# Patient Record
Sex: MALE | Race: WHITE | NOT HISPANIC OR LATINO | Employment: FULL TIME | URBAN - METROPOLITAN AREA
[De-identification: names, ages, dates, MRNs, and addresses within clinical notes are randomized per-mention and may not be internally consistent; named-entity substitution may affect disease eponyms.]

---

## 2017-08-29 ENCOUNTER — HOSPITAL ENCOUNTER (EMERGENCY)
Facility: HOSPITAL | Age: 32
Discharge: HOME | End: 2017-08-29
Attending: EMERGENCY MEDICINE | Admitting: EMERGENCY MEDICINE
Payer: MEDICAID

## 2017-08-29 VITALS
WEIGHT: 200 LBS | DIASTOLIC BLOOD PRESSURE: 67 MMHG | HEART RATE: 86 BPM | HEIGHT: 69 IN | RESPIRATION RATE: 16 BRPM | SYSTOLIC BLOOD PRESSURE: 127 MMHG | BODY MASS INDEX: 29.62 KG/M2 | TEMPERATURE: 98 F | OXYGEN SATURATION: 95 %

## 2017-08-29 DIAGNOSIS — F32.A DEPRESSION, UNSPECIFIED DEPRESSION TYPE: Primary | ICD-10-CM

## 2017-08-29 DIAGNOSIS — F10.920 ALCOHOL INTOXICATION, UNCOMPLICATED: ICD-10-CM

## 2017-08-29 DIAGNOSIS — R45.851 SUICIDAL IDEATION: ICD-10-CM

## 2017-08-29 LAB
ALBUMIN SERPL BCP-MCNC: 4 G/DL
ALP SERPL-CCNC: 83 U/L
ALT SERPL W/O P-5'-P-CCNC: 21 U/L
AMPHET+METHAMPHET UR QL: NEGATIVE
ANION GAP SERPL CALC-SCNC: 8 MMOL/L
APAP SERPL-MCNC: <3 UG/ML
AST SERPL-CCNC: 28 U/L
BARBITURATES UR QL SCN>200 NG/ML: NEGATIVE
BASOPHILS # BLD AUTO: 0.04 K/UL
BASOPHILS NFR BLD: 0.6 %
BENZODIAZ UR QL SCN>200 NG/ML: NEGATIVE
BILIRUB SERPL-MCNC: 0.2 MG/DL
BILIRUB UR QL STRIP: NEGATIVE
BUN SERPL-MCNC: 7 MG/DL
BZE UR QL SCN: NEGATIVE
CALCIUM SERPL-MCNC: 9 MG/DL
CANNABINOIDS UR QL SCN: NORMAL
CHLORIDE SERPL-SCNC: 110 MMOL/L
CLARITY UR REFRACT.AUTO: CLEAR
CO2 SERPL-SCNC: 24 MMOL/L
COLOR UR AUTO: NORMAL
CREAT SERPL-MCNC: 0.8 MG/DL
CREAT UR-MCNC: 26 MG/DL
DIFFERENTIAL METHOD: ABNORMAL
EOSINOPHIL # BLD AUTO: 0.4 K/UL
EOSINOPHIL NFR BLD: 6.5 %
ERYTHROCYTE [DISTWIDTH] IN BLOOD BY AUTOMATED COUNT: 13.7 %
EST. GFR  (AFRICAN AMERICAN): >60 ML/MIN/1.73 M^2
EST. GFR  (NON AFRICAN AMERICAN): >60 ML/MIN/1.73 M^2
ETHANOL SERPL-MCNC: 249 MG/DL
GLUCOSE SERPL-MCNC: 93 MG/DL
GLUCOSE UR QL STRIP: NEGATIVE
HCT VFR BLD AUTO: 42.7 %
HGB BLD-MCNC: 14.8 G/DL
HGB UR QL STRIP: NEGATIVE
KETONES UR QL STRIP: NEGATIVE
LEUKOCYTE ESTERASE UR QL STRIP: NEGATIVE
LYMPHOCYTES # BLD AUTO: 2.8 K/UL
LYMPHOCYTES NFR BLD: 42.8 %
MCH RBC QN AUTO: 32.2 PG
MCHC RBC AUTO-ENTMCNC: 34.7 G/DL
MCV RBC AUTO: 93 FL
METHADONE UR QL SCN>300 NG/ML: NEGATIVE
MONOCYTES # BLD AUTO: 0.6 K/UL
MONOCYTES NFR BLD: 8.5 %
NEUTROPHILS # BLD AUTO: 2.7 K/UL
NEUTROPHILS NFR BLD: 41.1 %
NITRITE UR QL STRIP: NEGATIVE
OPIATES UR QL SCN: NEGATIVE
PCP UR QL SCN>25 NG/ML: NEGATIVE
PH UR STRIP: 5 [PH] (ref 5–8)
PLATELET # BLD AUTO: 312 K/UL
PMV BLD AUTO: 9.5 FL
POTASSIUM SERPL-SCNC: 4.3 MMOL/L
PROT SERPL-MCNC: 7.8 G/DL
PROT UR QL STRIP: NEGATIVE
RBC # BLD AUTO: 4.59 M/UL
SALICYLATES SERPL-MCNC: <5 MG/DL
SODIUM SERPL-SCNC: 142 MMOL/L
SP GR UR STRIP: 1 (ref 1–1.03)
TOXICOLOGY INFORMATION: NORMAL
TSH SERPL DL<=0.005 MIU/L-ACNC: 0.93 UIU/ML
URN SPEC COLLECT METH UR: NORMAL
UROBILINOGEN UR STRIP-ACNC: NEGATIVE EU/DL
WBC # BLD AUTO: 6.47 K/UL

## 2017-08-29 PROCEDURE — 81003 URINALYSIS AUTO W/O SCOPE: CPT

## 2017-08-29 PROCEDURE — 80320 DRUG SCREEN QUANTALCOHOLS: CPT

## 2017-08-29 PROCEDURE — 80329 ANALGESICS NON-OPIOID 1 OR 2: CPT

## 2017-08-29 PROCEDURE — 80307 DRUG TEST PRSMV CHEM ANLYZR: CPT

## 2017-08-29 PROCEDURE — 99285 EMERGENCY DEPT VISIT HI MDM: CPT | Mod: ,,, | Performed by: EMERGENCY MEDICINE

## 2017-08-29 PROCEDURE — 99285 EMERGENCY DEPT VISIT HI MDM: CPT

## 2017-08-29 PROCEDURE — 85025 COMPLETE CBC W/AUTO DIFF WBC: CPT

## 2017-08-29 PROCEDURE — 84443 ASSAY THYROID STIM HORMONE: CPT

## 2017-08-29 PROCEDURE — 99285 EMERGENCY DEPT VISIT HI MDM: CPT | Mod: SA,HB,S$PBB, | Performed by: NURSE PRACTITIONER

## 2017-08-29 PROCEDURE — 80053 COMPREHEN METABOLIC PANEL: CPT

## 2017-08-29 NOTE — CONSULTS
"8/29/2017 1:10 PM   Tito Lua   1985   [id number]  DATE OF ADMISSION: 8/29/2017 10:08 AM           PSYCHIATRY CONSULT NOTE    Brief HPI:  This is a 32 y.o. Male with a reported history of depression who presents to the ED with a chief complaint of suicidal ideation. Patient states that something triggered him today, but he "would rather not talk about it." Endorses depression "for a while" and SI , states he has a plan "in the long run." He has never tried to hurt himself before, has never been evaluated by Psychiatry before.    Pt reported that he wanted to he recently started recieving Medicaid benefits and wanted to start getting help for anxiety and depression.  Pt currently denies suicidal ideations but reports having passive suicidal thoughts over the past 2 years.  Stated "I just thought that if I don't start getting help now then I'm worried about possibly being suicidal in the future".  Pt denies history of self harming.  Pt contracted for safety. Pt reports that he was drinking beer last night and called a friend to bring him to the hospital.  Pt has no past psychiatric history.  Pt identified his main psychosocial stressor as "family issues".  Pt reports that he is willing to explore mental health community resources.      Chief Complaint /Reason for Consult: suicidal ideations    Assessment:    Major Depressive Disorder 296.21 (f32.0)  Generalized Anxiety Disorder 300.02 (f41.1)      Recommendations:  · PSYCH Meds- none  · Legal-rescind PEC as pt is not a danger to themselves or others  Does not meet criteria for Inpt admission until stabilization of psychiatric symptoms.  · The above was discussed with staff psychiatrist and update any changes after rounds in the afternoon.  · Thank you for this consult will continue to follow  · Will provide resources for outpatient mental health treatment.    Subjective:    Collateral:  n/a    Currently, the patient is endorsing the following:    Psychiatric " Review Of Systems - Is patient experiencing or having changes in:  Sleep:  no  appetite: no  weight: no  energy/anergy: yes  interest/pleasure/anhedonia: yes  somatic symptoms: no  libido: no  guilty/hopelessness: yes  concentration: yes  S.I.B.s/risky behavior: no  SI/SA:  Passive suicidal thoughts.  Pt has never acted on SI    anxiety/panic: yes  Agoraphobia:  no  Social phobia:  yes  Recurrent nightmares:  no  hyper startle response:  no  Avoidance:no  Recurrent thoughts:  {no  Recurrent behaviors:  no    Irritability: yes  Racing thoughts: no  Impulsive behaviors: no  Pressured speech:  no    Paranoia: no  Delusions: no  AVH: no    History reviewed. No pertinent past medical history.    History reviewed. No pertinent surgical history.    Social History     Social History    Marital status: Single     Spouse name: N/A    Number of children: N/A    Years of education: N/A     Social History Main Topics    Smoking status: Current Every Day Smoker    Smokeless tobacco: None    Alcohol use Yes    Drug use:      Types: Marijuana    Sexual activity: Not Asked     Other Topics Concern    None     Social History Narrative    None       No current facility-administered medications for this encounter.      No current outpatient prescriptions on file.       Review of patient's allergies indicates:  No Known Allergies    Scheduled Meds:none      Psychotherapeutics     None          Past Psychiatric History:  Previous Medication Trials: no   Previous Psychiatric Hospitalizations: no   Previous Suicide Attempts: no   History of Violence: no  Outpatient Psychiatrist: no    Social History:  Marital Status: single  Children: 0   Employment Status/Info: currently employed  Education: high school diploma/GED  Special Ed: no  :  Amish:   Housing Status:   Hobbies/Leisure time:  History of phys/sexual abuse: unknown  Access to gun: no    Family Psychiatric History:     Substance Abuse History:  Recreational  Drugs: marijuana  Use of Alcohol: moderate  Rehab History:no   Tobacco Use:yes  Use of Caffeine: denies use  Use of OTC: none  Legal consequences of chemical use: no    Legal History:  Past Charges/Incarcerations:yes, patient arrested >10 years ago   Pending charges:no     Psychosocial Stressors: family.   Functioning Relationships: alone & isolated  Strengths AND Liabilities  Strength: Patient accepts guidance/feedback, Liability: Patient lacks social skills., Liability: Patient lacks coping skills.    Psychosocial Factors:  Maladaptive or problem behaviors: pt reports self-medicating with alcohol and marijuana  Peer group, social, ethic, cultural, emotional, and health factors:  Living situation, family constellation, family circumstances/home: lives with roommates  Community resources used by patient: none  Treatment acceptance/motivation for change: Pt expressed desire to learn of community mental health resources    Is the patient aware of the biomedical complications associated with substance abuse and mental illness? yes    Does the patient have an Advance Directive for Mental Health treatment? no   - if yes, inform patient to bring copy.    Objective:    Vitals:    08/29/17 1006   BP: 121/68   Pulse: 90   Resp: 18   Temp: 98.4 °F (36.9 °C)           Recent Labs  Lab 08/29/17  1033   GLU 93   CALCIUM 9.0   ALBUMIN 4.0   PROT 7.8      K 4.3   CO2 24      BUN 7   CREATININE 0.8   ALKPHOS 83   ALT 21   AST 28   BILITOT 0.2     Lab Results   Component Value Date    WBC 6.47 08/29/2017    HGB 14.8 08/29/2017    HCT 42.7 08/29/2017    MCV 93 08/29/2017     08/29/2017     Lab Results   Component Value Date     08/29/2017    BUN 7 08/29/2017    CREATININE 0.8 08/29/2017    TSH 0.927 08/29/2017    WBC 6.47 08/29/2017     No results found for: PHENYTOIN, PHENOBARB, VALPROATE, CBMZ  Urinalysis    Recent Labs  Lab 08/29/17  1025   COLORU Straw   SPECGRAV 1.005   PHUR 5.0   PROTEINUA Negative    NITRITE Negative   LEUKOCYTESUR Negative   UROBILINOGEN Negative     No results for input(s): POCTGLUCOSE in the last 72 hours.    Medical ROS  General ROS: negative for - chills, fatigue or fever  Respiratory ROS: no cough, shortness of breath, or wheezing  Cardiovascular ROS: no chest pain or dyspnea on exertion  Gastrointestinal ROS: no abdominal pain, change in bowel habits, or black or bloody stools  Musculoskeletal ROS: negative for - gait disturbance, joint stiffness, muscle pain or muscular weakness  Neurological ROS: negative for - confusion, dizziness, gait disturbance, headaches, impaired coordination/balance, seizures or visual changes    Mental Status Exam:  Appearance: age appropriate, disheveled  Behavior/Cooperation: limited/ appopriate cooperative  Speech: appropriate rate, volume and tone normal tone, normal rate, normal pitch, normal volume  Mood: depressed  Affect:  congruent with mood and appropriate to situation/content Normal  Thought Process: normal and logical  Thought Content: normal, no suicidality, no homicidality, delusions, or paranoia  Sensorium:   Impaired/  Limited/ Intact grossly intact, person, place, situation, time/date  Alert and Oriented: x3  Memory: 3/3 immediate, 2/3 at 5 minutes    Recent:  Impaired/  Limited/ Intact; able to report recent events   Remote:  Impaired/  Limited/ Intact; Named 4/4 past presidents   Attention/concentration: appropriate for age/education. w-o-r-l-d; d-l-r-o-w   Similarities:  Impaired/  Limited/ Intact; (difference between apple and orange?)  Abstract reasoning:  Impaired/  Limited/ Intact  Insight:  Impaired/  Limited/ Intact  Judgment: Impaired/  Limited/ Intact      8/29/2017 1:10 PM

## 2017-08-29 NOTE — ED NOTES
Patient resting comfortably in NAD on stretcher with sitter at the bedside. All cables and equipment removed from patient room. Denies needing to use the restroom and denies pain. Will continue to monitor.

## 2017-08-29 NOTE — ED NOTES
Bed: [facility] 02  Expected date:   Expected time:   Means of arrival:   Comments:

## 2017-08-29 NOTE — ED NOTES
Patient resting comfortably on stretcher. Sitter is at the bedside. All cords equipment removed from room for patient's safety. No complaints of pain, and denies needing to use the bathroom. Will continue to monitor.

## 2017-08-29 NOTE — ED NOTES
Patient resting comfortably on stretcher. Sitter is at the bedside. All cords equipment removed from room for patient's safety. Will continue to monitor.

## 2017-08-29 NOTE — ED NOTES
"Patient states "I'm here because I need to be evaluated for mental health issues.  I have had thoughts of hurting myself".   Denies previous attempts or SI/HI.  Denies AH/VH.  Admits to drug (Marijuana) and alcohol abuse (last drink around 0830 this morning).  Patient has pocket knife on person.    "

## 2017-08-29 NOTE — ED PROVIDER NOTES
"Encounter Date: 8/29/2017    SCRIBE #1 NOTE: I, Monica Harry, am scribing for, and in the presence of, Dr. Jones.       History     Chief Complaint   Patient presents with    Suicidal     This is a 32 y.o. Male with a reported history of depression who presents to the ED with a chief complaint of suicidal ideation. Patient states that something triggered him today, but he "would rather not talk about it." Endorses depression "for a while" and SI , states he has a plan "in the long run." He has never tried to hurt himself before, has never been evaluated by Psychiatry before.       The history is provided by the patient and medical records.     Review of patient's allergies indicates:  Allergies not on file  History reviewed. No pertinent past medical history.  History reviewed. No pertinent surgical history.  History reviewed. No pertinent family history.  Social History   Substance Use Topics    Smoking status: Current Every Day Smoker    Smokeless tobacco: Not on file    Alcohol use Yes     Review of Systems   Constitutional: Negative for chills, diaphoresis and fever.   HENT: Negative for sneezing.    Eyes: Negative for photophobia.   Respiratory: Negative for cough.    Cardiovascular: Negative for chest pain.   Gastrointestinal: Negative for abdominal pain, diarrhea, nausea and vomiting.   Musculoskeletal: Negative for back pain.   Skin: Negative for rash.   Hematological: Does not bruise/bleed easily.   Psychiatric/Behavioral: Positive for suicidal ideas.       Physical Exam     Initial Vitals [08/29/17 1006]   BP Pulse Resp Temp SpO2   121/68 90 18 98.4 °F (36.9 °C) 97 %      MAP       85.67         Physical Exam    Nursing note and vitals reviewed.  Constitutional: He appears well-developed and well-nourished. He is not diaphoretic. No distress.   HENT:   Head: Normocephalic and atraumatic.   Eyes: EOM are normal. Pupils are equal, round, and reactive to light.   Neck: Normal range of motion. Neck " supple.   Cardiovascular: Normal rate and regular rhythm. Exam reveals no gallop and no friction rub.    No murmur heard.  Pulmonary/Chest: Breath sounds normal. No respiratory distress. He has no wheezes. He has no rhonchi. He has no rales.   Abdominal: Soft. He exhibits no distension. There is no tenderness. There is no rebound and no guarding.   Musculoskeletal: Normal range of motion. He exhibits no edema or tenderness.   Neurological: He is alert and oriented to person, place, and time. He has normal strength. No cranial nerve deficit or sensory deficit.   Skin: Skin is warm and dry. No rash noted. No erythema.   Psychiatric:   Depressed, somewhat suicidal.         ED Course   Procedures  Labs Reviewed   CBC W/ AUTO DIFFERENTIAL - Abnormal; Notable for the following:        Result Value    RBC 4.59 (*)     MCH 32.2 (*)     All other components within normal limits   ALCOHOL,MEDICAL (ETHANOL) - Abnormal; Notable for the following:     Alcohol, Medical, Serum 249 (*)     All other components within normal limits   ACETAMINOPHEN LEVEL - Abnormal; Notable for the following:     Acetaminophen (Tylenol), Serum <3.0 (*)     All other components within normal limits   SALICYLATE LEVEL - Abnormal; Notable for the following:     Salicylate Lvl <5.0 (*)     All other components within normal limits   COMPREHENSIVE METABOLIC PANEL   TSH   DRUG SCREEN PANEL, URINE EMERGENCY    Narrative:     Preferred Collection Type->Urine, Clean Catch   URINALYSIS, REFLEX TO URINE CULTURE    Narrative:     Preferred Collection Type->Urine, Clean Catch             Medical Decision Making:   History:   Old Medical Records: I decided to obtain old medical records.  Initial Assessment:   Patient with depression and suicidal ideation. Will medically clear and have Psychiatry evaluate him.   Differential Diagnosis:   Depression, suicidal ideation, substance abuse, hypothyroidism  Clinical Tests:   Lab Tests: Ordered and Reviewed  ED  Management:  Pt evaled by psych and they feel he is safe to go home and recommends rescinding the PEC  Other:   I have discussed this case with another health care provider.            Scribe Attestation:   Scribe #1: I performed the above scribed service and the documentation accurately describes the services I performed. I attest to the accuracy of the note.    Attending Attestation:           Physician Attestation for Scribe:  Physician Attestation Statement for Scribe #1: I, Dr. Jones, reviewed documentation, as scribed by Monica Harry in my presence, and it is both accurate and complete.         Attending ED Notes:   Patient medically cleared after alcohol level less than 100. Will have Psychiatry evaluate.          ED Course     Clinical Impression:   The primary encounter diagnosis was Depression, unspecified depression type. Diagnoses of Suicidal ideation and Alcohol intoxication, uncomplicated were also pertinent to this visit.                           Tate Jones III, MD  08/29/17 3120

## 2017-08-29 NOTE — ED NOTES
Patient is in ED bed #20 .No signs of distress noted. Patient is in paper gown. Patient rights signed and on the chart. All cords and wires are out of the patients room. Patient belongings are removed from the room labeled and locked away. Patient sitter is at the bedside recording Q 15 minute checks. Sitter belongings are out of the room. Will continue to monitor the patient.

## 2018-08-16 ENCOUNTER — HOSPITAL ENCOUNTER (EMERGENCY)
Facility: OTHER | Age: 33
Discharge: HOME | End: 2018-08-16
Attending: EMERGENCY MEDICINE
Payer: MEDICAID

## 2018-08-16 VITALS
WEIGHT: 169.75 LBS | HEART RATE: 112 BPM | DIASTOLIC BLOOD PRESSURE: 78 MMHG | RESPIRATION RATE: 18 BRPM | SYSTOLIC BLOOD PRESSURE: 115 MMHG | TEMPERATURE: 99 F | OXYGEN SATURATION: 99 % | HEIGHT: 69 IN | BODY MASS INDEX: 25.14 KG/M2

## 2018-08-16 DIAGNOSIS — L03.116 CELLULITIS OF LEFT LOWER EXTREMITY: Primary | ICD-10-CM

## 2018-08-16 PROCEDURE — 99283 EMERGENCY DEPT VISIT LOW MDM: CPT

## 2018-08-16 RX ORDER — SULFAMETHOXAZOLE AND TRIMETHOPRIM 800; 160 MG/1; MG/1
1 TABLET ORAL 2 TIMES DAILY
Qty: 14 TABLET | Refills: 0 | Status: ON HOLD | OUTPATIENT
Start: 2018-08-16 | End: 2018-08-22 | Stop reason: SDUPTHER

## 2018-08-16 RX ORDER — MUPIROCIN 20 MG/G
OINTMENT TOPICAL 3 TIMES DAILY
Qty: 22 G | Refills: 0 | Status: SHIPPED | OUTPATIENT
Start: 2018-08-16 | End: 2018-08-26

## 2018-08-16 NOTE — ED PROVIDER NOTES
Encounter Date: 8/16/2018       History     Chief Complaint   Patient presents with    Abscess     pt noted redness and swelling worsening around what appeared to be an insect bite to anterior LLE two days ago. tenderness to surrounding area.     33-year-old male with no significant past medical history presents emergency department with complaints of possible infection to his left lower leg.  He states that it has gradually worsened over the last few days.  He states that he thought it was initially an insect bite however it is not getting better.  He reports associated pain at a 3/3.  He denies any fever, chills, trauma, injury, difficulty ambulating streaking.  He states that he attempted to express pus from the site due to there being a centralized open wound and was unable to do so       The history is provided by the patient.     Review of patient's allergies indicates:  No Known Allergies  History reviewed. No pertinent past medical history.  History reviewed. No pertinent surgical history.  History reviewed. No pertinent family history.  Social History     Tobacco Use    Smoking status: Current Every Day Smoker   Substance Use Topics    Alcohol use: Yes    Drug use: Yes     Types: Marijuana     Review of Systems   Constitutional: Negative for chills and fever.   HENT: Negative for sore throat.    Respiratory: Negative for shortness of breath.    Cardiovascular: Negative for chest pain.   Gastrointestinal: Negative for nausea and vomiting.   Genitourinary: Negative for dysuria.   Musculoskeletal: Negative for back pain.   Skin: Positive for color change and wound. Negative for rash.   Neurological: Negative for weakness and numbness.   Hematological: Does not bruise/bleed easily.       Physical Exam     Initial Vitals [08/16/18 1827]   BP Pulse Resp Temp SpO2   115/78 (!) 112 18 99.4 °F (37.4 °C) 99 %      MAP       --         Physical Exam    Nursing note and vitals reviewed.  Constitutional: He appears  well-developed and well-nourished. He is not diaphoretic.  Non-toxic appearance. No distress.   HENT:   Head: Normocephalic and atraumatic.   Right Ear: External ear normal.   Left Ear: External ear normal.   Nose: Nose normal.   Mouth/Throat: Oropharynx is clear and moist.   Eyes: Conjunctivae, EOM and lids are normal. Pupils are equal, round, and reactive to light. No scleral icterus.   Neck: Normal range of motion and phonation normal. Neck supple.   Cardiovascular: Normal rate, regular rhythm and normal heart sounds. Exam reveals no gallop and no friction rub.    No murmur heard.  Abdominal: Normal appearance.   Musculoskeletal: Normal range of motion.   No obvious deformities, moving all extremities, normal gait   Neurological: He is alert and oriented to person, place, and time. He has normal strength. No sensory deficit.   Skin: Skin is warm, dry and intact. No lesion and no rash noted. There is erythema.        Patient has a small area of erythema that measures approximately 2 cm in diameter to the left lower leg.  There is a central unroofed vesicle/pustule.  Unable to express any purulence from the site.  No significant fluctuance or induration.  No lymphangitis.   Psychiatric: He has a normal mood and affect. His speech is normal and behavior is normal. Judgment normal. Cognition and memory are normal.         ED Course   Procedures  Labs Reviewed - No data to display       Imaging Results    None          Medical Decision Making:   History:   Old Medical Records: I decided to obtain old medical records.  Initial Assessment:   33-year-old male with complaints concerning for possible early abscess versus mild cellulitis.  Afebrile neurovascularly intact. He is alert and healthy and nontoxic appearing.  He is in no apparent distress. Exam documented above.  There is no abscess that indicates I and D.  No lymphangitis.  He denies trauma or injury. He is ambulatory.  ED Management:  Will treat with Bactrim  and Bactroban topical ointment.  He is given care instructions.  He is to follow up with primary care was given information for [facility] or return to the emergency department for any worsening signs or symptoms.  He states understanding agrees with this plan.  This is the extent of patient's complaints today.  This note was created using [employer] dictation.  There may be typographical errors secondary to dictation.                        Clinical Impression:     1. Cellulitis of left lower extremity            Disposition:   Disposition: Discharged  Condition: Stable                        Rebeca Boyer PA-C  08/16/18 [id number]

## 2018-08-16 NOTE — ED TRIAGE NOTES
Pt presents to the ED with c/o abscess to LLE x 3 days. Pt states that a unknown insect bit him on his left leg. Pt states that he has swelling and redness to left leg that has worsen over the past 2 days. Pt states last night he felt warm. Pt denies sob, chill, cp and n/v/d. Pt in NAD.

## 2018-08-20 ENCOUNTER — HOSPITAL ENCOUNTER (OUTPATIENT)
Facility: OTHER | Age: 33
Discharge: HOME | End: 2018-08-22
Attending: EMERGENCY MEDICINE | Admitting: EMERGENCY MEDICINE
Payer: MEDICAID

## 2018-08-20 DIAGNOSIS — L03.116 CELLULITIS OF LEFT LOWER EXTREMITY: Primary | ICD-10-CM

## 2018-08-20 PROCEDURE — 96365 THER/PROPH/DIAG IV INF INIT: CPT

## 2018-08-20 PROCEDURE — 96366 THER/PROPH/DIAG IV INF ADDON: CPT

## 2018-08-20 PROCEDURE — 99285 EMERGENCY DEPT VISIT HI MDM: CPT | Mod: 25

## 2018-08-20 RX ORDER — OXYCODONE AND ACETAMINOPHEN 5; 325 MG/1; MG/1
1 TABLET ORAL
Status: COMPLETED | OUTPATIENT
Start: 2018-08-20 | End: 2018-08-21

## 2018-08-20 RX ORDER — VANCOMYCIN HCL IN 5 % DEXTROSE 1G/250ML
1000 PLASTIC BAG, INJECTION (ML) INTRAVENOUS
Status: COMPLETED | OUTPATIENT
Start: 2018-08-20 | End: 2018-08-21

## 2018-08-21 PROBLEM — Z72.0 TOBACCO ABUSE: Status: ACTIVE | Noted: 2018-08-21

## 2018-08-21 PROBLEM — L03.116 CELLULITIS OF LEFT LOWER EXTREMITY: Status: ACTIVE | Noted: 2018-08-21

## 2018-08-21 LAB
ALBUMIN SERPL BCP-MCNC: 3.3 G/DL
ALP SERPL-CCNC: 58 U/L
ALT SERPL W/O P-5'-P-CCNC: 11 U/L
ANION GAP SERPL CALC-SCNC: 11 MMOL/L
ANION GAP SERPL CALC-SCNC: 8 MMOL/L
AST SERPL-CCNC: 14 U/L
BASOPHILS # BLD AUTO: 0.01 K/UL
BASOPHILS # BLD AUTO: 0.02 K/UL
BASOPHILS NFR BLD: 0.1 %
BASOPHILS NFR BLD: 0.2 %
BILIRUB SERPL-MCNC: 0.3 MG/DL
BUN SERPL-MCNC: 7 MG/DL
BUN SERPL-MCNC: 9 MG/DL
CALCIUM SERPL-MCNC: 9.1 MG/DL
CALCIUM SERPL-MCNC: 9.2 MG/DL
CHLORIDE SERPL-SCNC: 105 MMOL/L
CHLORIDE SERPL-SCNC: 107 MMOL/L
CHOLEST SERPL-MCNC: 111 MG/DL
CHOLEST/HDLC SERPL: 2.6 {RATIO}
CO2 SERPL-SCNC: 23 MMOL/L
CO2 SERPL-SCNC: 24 MMOL/L
CREAT SERPL-MCNC: 0.8 MG/DL
CREAT SERPL-MCNC: 1 MG/DL
CRP SERPL-MCNC: 86.2 MG/L
DIFFERENTIAL METHOD: ABNORMAL
DIFFERENTIAL METHOD: ABNORMAL
EOSINOPHIL # BLD AUTO: 0.2 K/UL
EOSINOPHIL # BLD AUTO: 0.3 K/UL
EOSINOPHIL NFR BLD: 2.1 %
EOSINOPHIL NFR BLD: 3.6 %
ERYTHROCYTE [DISTWIDTH] IN BLOOD BY AUTOMATED COUNT: 12.7 %
ERYTHROCYTE [DISTWIDTH] IN BLOOD BY AUTOMATED COUNT: 12.8 %
ERYTHROCYTE [SEDIMENTATION RATE] IN BLOOD: 42 MM/HR
EST. GFR  (AFRICAN AMERICAN): >60 ML/MIN/1.73 M^2
EST. GFR  (AFRICAN AMERICAN): >60 ML/MIN/1.73 M^2
EST. GFR  (NON AFRICAN AMERICAN): >60 ML/MIN/1.73 M^2
EST. GFR  (NON AFRICAN AMERICAN): >60 ML/MIN/1.73 M^2
ESTIMATED AVG GLUCOSE: 94 MG/DL
GLUCOSE SERPL-MCNC: 116 MG/DL
GLUCOSE SERPL-MCNC: 82 MG/DL
HBA1C MFR BLD HPLC: 4.9 %
HCT VFR BLD AUTO: 38.3 %
HCT VFR BLD AUTO: 40.6 %
HDLC SERPL-MCNC: 42 MG/DL
HDLC SERPL: 37.8 %
HGB BLD-MCNC: 12.9 G/DL
HGB BLD-MCNC: 13.4 G/DL
LDLC SERPL CALC-MCNC: 53.2 MG/DL
LYMPHOCYTES # BLD AUTO: 2 K/UL
LYMPHOCYTES # BLD AUTO: 2.3 K/UL
LYMPHOCYTES NFR BLD: 23.5 %
LYMPHOCYTES NFR BLD: 24.8 %
MAGNESIUM SERPL-MCNC: 2.1 MG/DL
MCH RBC QN AUTO: 30.8 PG
MCH RBC QN AUTO: 31.3 PG
MCHC RBC AUTO-ENTMCNC: 33 G/DL
MCHC RBC AUTO-ENTMCNC: 33.7 G/DL
MCV RBC AUTO: 93 FL
MCV RBC AUTO: 93 FL
MONOCYTES # BLD AUTO: 0.7 K/UL
MONOCYTES # BLD AUTO: 0.8 K/UL
MONOCYTES NFR BLD: 7.8 %
MONOCYTES NFR BLD: 8.9 %
NEUTROPHILS # BLD AUTO: 5.1 K/UL
NEUTROPHILS # BLD AUTO: 6.5 K/UL
NEUTROPHILS NFR BLD: 62.3 %
NEUTROPHILS NFR BLD: 66.3 %
NONHDLC SERPL-MCNC: 69 MG/DL
PHOSPHATE SERPL-MCNC: 2.8 MG/DL
PLATELET # BLD AUTO: 318 K/UL
PLATELET # BLD AUTO: 327 K/UL
PMV BLD AUTO: 9.5 FL
PMV BLD AUTO: 9.8 FL
POTASSIUM SERPL-SCNC: 4.1 MMOL/L
POTASSIUM SERPL-SCNC: 4.3 MMOL/L
PROT SERPL-MCNC: 6.9 G/DL
RBC # BLD AUTO: 4.12 M/UL
RBC # BLD AUTO: 4.35 M/UL
SODIUM SERPL-SCNC: 139 MMOL/L
SODIUM SERPL-SCNC: 139 MMOL/L
TRIGL SERPL-MCNC: 79 MG/DL
WBC # BLD AUTO: 8.12 K/UL
WBC # BLD AUTO: 9.82 K/UL

## 2018-08-21 PROCEDURE — S4991 NICOTINE PATCH NONLEGEND: HCPCS | Performed by: PHYSICIAN ASSISTANT

## 2018-08-21 PROCEDURE — 25000003 PHARM REV CODE 250: Performed by: NURSE PRACTITIONER

## 2018-08-21 PROCEDURE — 80053 COMPREHEN METABOLIC PANEL: CPT

## 2018-08-21 PROCEDURE — 94761 N-INVAS EAR/PLS OXIMETRY MLT: CPT

## 2018-08-21 PROCEDURE — 63600175 PHARM REV CODE 636 W HCPCS: Performed by: HOSPITALIST

## 2018-08-21 PROCEDURE — 86140 C-REACTIVE PROTEIN: CPT

## 2018-08-21 PROCEDURE — 85025 COMPLETE CBC W/AUTO DIFF WBC: CPT | Mod: 91

## 2018-08-21 PROCEDURE — 36415 COLL VENOUS BLD VENIPUNCTURE: CPT

## 2018-08-21 PROCEDURE — 25000003 PHARM REV CODE 250: Performed by: PHYSICIAN ASSISTANT

## 2018-08-21 PROCEDURE — 99220 PR INITIAL OBSERVATION CARE,LEVL III: CPT | Mod: ,,, | Performed by: NURSE PRACTITIONER

## 2018-08-21 PROCEDURE — 83036 HEMOGLOBIN GLYCOSYLATED A1C: CPT

## 2018-08-21 PROCEDURE — 84100 ASSAY OF PHOSPHORUS: CPT

## 2018-08-21 PROCEDURE — 85651 RBC SED RATE NONAUTOMATED: CPT

## 2018-08-21 PROCEDURE — 87186 SC STD MICRODIL/AGAR DIL: CPT

## 2018-08-21 PROCEDURE — 87077 CULTURE AEROBIC IDENTIFY: CPT

## 2018-08-21 PROCEDURE — 87070 CULTURE OTHR SPECIMN AEROBIC: CPT | Mod: 59

## 2018-08-21 PROCEDURE — 63600175 PHARM REV CODE 636 W HCPCS: Performed by: PHYSICIAN ASSISTANT

## 2018-08-21 PROCEDURE — G0378 HOSPITAL OBSERVATION PER HR: HCPCS

## 2018-08-21 PROCEDURE — 87040 BLOOD CULTURE FOR BACTERIA: CPT

## 2018-08-21 PROCEDURE — 80048 BASIC METABOLIC PNL TOTAL CA: CPT

## 2018-08-21 PROCEDURE — 83735 ASSAY OF MAGNESIUM: CPT

## 2018-08-21 PROCEDURE — 80061 LIPID PANEL: CPT

## 2018-08-21 PROCEDURE — 25000003 PHARM REV CODE 250: Performed by: HOSPITALIST

## 2018-08-21 RX ORDER — ENOXAPARIN SODIUM 100 MG/ML
40 INJECTION SUBCUTANEOUS EVERY 24 HOURS
Status: DISCONTINUED | OUTPATIENT
Start: 2018-08-21 | End: 2018-08-22 | Stop reason: HOSPADM

## 2018-08-21 RX ORDER — OXYCODONE HYDROCHLORIDE 5 MG/1
10 TABLET ORAL EVERY 6 HOURS PRN
Status: DISCONTINUED | OUTPATIENT
Start: 2018-08-21 | End: 2018-08-22 | Stop reason: HOSPADM

## 2018-08-21 RX ORDER — ACETAMINOPHEN 325 MG/1
650 TABLET ORAL EVERY 4 HOURS PRN
Status: DISCONTINUED | OUTPATIENT
Start: 2018-08-21 | End: 2018-08-22 | Stop reason: HOSPADM

## 2018-08-21 RX ORDER — SODIUM CHLORIDE 9 MG/ML
INJECTION, SOLUTION INTRAVENOUS CONTINUOUS
Status: DISCONTINUED | OUTPATIENT
Start: 2018-08-21 | End: 2018-08-21

## 2018-08-21 RX ORDER — IBUPROFEN 200 MG
1 TABLET ORAL DAILY
Status: DISCONTINUED | OUTPATIENT
Start: 2018-08-21 | End: 2018-08-22 | Stop reason: HOSPADM

## 2018-08-21 RX ORDER — SODIUM CHLORIDE 0.9 % (FLUSH) 0.9 %
5 SYRINGE (ML) INJECTION
Status: DISCONTINUED | OUTPATIENT
Start: 2018-08-21 | End: 2018-08-22 | Stop reason: HOSPADM

## 2018-08-21 RX ORDER — ONDANSETRON 8 MG/1
8 TABLET, ORALLY DISINTEGRATING ORAL EVERY 8 HOURS PRN
Status: DISCONTINUED | OUTPATIENT
Start: 2018-08-21 | End: 2018-08-22 | Stop reason: HOSPADM

## 2018-08-21 RX ADMIN — OXYCODONE AND ACETAMINOPHEN 1 TABLET: 5; 325 TABLET ORAL at 12:08

## 2018-08-21 RX ADMIN — NICOTINE 1 PATCH: 21 PATCH, EXTENDED RELEASE TRANSDERMAL at 06:08

## 2018-08-21 RX ADMIN — OXYCODONE HYDROCHLORIDE 10 MG: 5 TABLET ORAL at 03:08

## 2018-08-21 RX ADMIN — VANCOMYCIN HYDROCHLORIDE 1500 MG: 1 INJECTION, POWDER, LYOPHILIZED, FOR SOLUTION INTRAVENOUS at 03:08

## 2018-08-21 RX ADMIN — SODIUM CHLORIDE: 0.9 INJECTION, SOLUTION INTRAVENOUS at 03:08

## 2018-08-21 RX ADMIN — VANCOMYCIN HYDROCHLORIDE 1000 MG: 1 INJECTION, POWDER, LYOPHILIZED, FOR SOLUTION INTRAVENOUS at 12:08

## 2018-08-21 NOTE — PLAN OF CARE
SW met with pt at bedside to complete discharge assessment and uses [facility] on [address]. Pt has no PCP listed but Medicaid/[employer]na card has Dr. Danika Valero -[id number]. Pt's mother will provide transportation upon discharge.       08/21/18 1205   Discharge Assessment   Assessment Type Discharge Planning Assessment   Confirmed/corrected address and phone number on facesheet? Yes   Assessment information obtained from? Patient   Communicated expected length of stay with patient/caregiver no   Prior to hospitilization cognitive status: Alert/Oriented   Prior to hospitalization functional status: Independent   Current cognitive status: Alert/Oriented   Current Functional Status: Independent   Lives With friend(s)   Able to Return to Prior Arrangements yes   Is patient able to care for self after discharge? Yes   Patient's perception of discharge disposition home or selfcare   Readmission Within The Last 30 Days no previous admission in last 30 days   Patient currently being followed by outpatient case management? No   Patient currently receives any other outside agency services? No   Equipment Currently Used at Home none   Do you have any problems affording any of your prescribed medications? No   Is the patient taking medications as prescribed? yes   Does the patient have transportation home? Yes   Transportation Available family or friend will provide   Does the patient receive services at the [facility]mad[facility] Clinic? No   Discharge Plan A Home   Patient/Family In Agreement With Plan yes

## 2018-08-21 NOTE — PLAN OF CARE
Problem: Patient Care Overview  Goal: Plan of Care Review  Outcome: Ongoing (interventions implemented as appropriate)  Mr. Lua is currently resting, VSS, NAD. Pain well controlled via current PRN regimen. Tolerated sandwich tray without difficulty. LLE wound warm, red, and swollen - area marked. Pt ambulates to restroom and voided without difficulty. Up to date with POC.

## 2018-08-21 NOTE — HPI
Patient is a 33-year-old male with history of IV drug use who presents to the ED with a skin complaint.  Patient was seen here 4 days ago for a red, raised area to his left shin.  He was diagnosed with cellulitis and sent home with Bactrim and Bactroban.  Patient reports compliance with this.  He also reports an area of dark tissue that he noticed couple of days ago.  He states is not rapidly progress.  He also reports applying hydrogen peroxide and picking at this area.  He reports clear drainage. He reports chills but has not taken his temperature.  He denies numbness or tingling to this area.

## 2018-08-21 NOTE — PLAN OF CARE
Problem: Infection, Risk/Actual (Adult)  Goal: Infection Prevention/Resolution  Patient will demonstrate the desired outcomes by discharge/transition of care.  Outcome: Ongoing (interventions implemented as appropriate)  No significant events this shift. Remains free from fall, injury, and skin breakdown. Ambulates independently to bathroom. VSS stable on RA and afebrile. Positions self independently. Tolerating Q2H turning schedule. Pain controlled with PO PRN meds. Neuro checks WDL. Tolerating ordered diet. IV site WNL. Plan of care reviewed with patient and all questions answered. Bed low, locked w/ bed alarm on. Call light within reach. Purposeful rounding performed. No other complaints at this time.

## 2018-08-21 NOTE — ED NOTES
Pt to ED with CO large non-draining abscess to anterior aspect of lower leg. Quarter sized necrotic area noted to the center of abscess. Pt states symptoms began aprox 1 week ago, and he did try to drain himself, and has been cleaning with peroxide.

## 2018-08-21 NOTE — ED PROVIDER NOTES
Encounter Date: 8/20/2018       History     Chief Complaint   Patient presents with    Abscess     to the front of the L lower leg x 1 wk     Patient is a 33-year-old male with history of IV drug use who presents to the ED with a skin complaint.  Patient was seen here 4 days ago for a red, raised area to his left shin.  He was diagnosed with cellulitis and sent home with Bactrim and Bactroban.  Patient reports compliance with this.  He also reports an area of dark tissue that he noticed couple of days ago.  He states is not rapidly progress.  He also reports applying hydrogen peroxide and picking at this area.  He reports clear drainage. He reports chills but has not taken his temperature.  He denies numbness or tingling to this area.      The history is provided by the patient.     Review of patient's allergies indicates:  No Known Allergies  History reviewed. No pertinent past medical history.  Past Surgical History:   Procedure Laterality Date    HERNIA REPAIR       History reviewed. No pertinent family history.  Social History     Tobacco Use    Smoking status: Current Every Day Smoker    Smokeless tobacco: Never Used   Substance Use Topics    Alcohol use: Yes    Drug use: Yes     Types: Marijuana     Review of Systems   Constitutional: Negative for chills and fever.   HENT: Negative for congestion and sore throat.    Eyes: Negative for pain.   Respiratory: Negative for shortness of breath.    Cardiovascular: Negative for chest pain.   Gastrointestinal: Negative for abdominal pain, diarrhea, nausea and vomiting.   Genitourinary: Negative for dysuria.   Musculoskeletal: Negative for arthralgias.   Skin: Positive for wound.        Redness, erythema, and pain to left lower extremity   Neurological: Negative for headaches.       Physical Exam     Initial Vitals [08/20/18 2026]   BP Pulse Resp Temp SpO2   131/61 110 20 98.2 °F (36.8 °C) 97 %      MAP       --         Physical Exam    Constitutional: Vital signs  are normal. He is cooperative. No distress.   Patient is nontoxic-appearing   HENT:   Head: Normocephalic and atraumatic.   Eyes: EOM are normal. Pupils are equal, round, and reactive to light.   Neck: Normal range of motion. Neck supple.   Cardiovascular: Normal rate, regular rhythm and intact distal pulses.   Pulmonary/Chest: Breath sounds normal. He has no wheezes. He has no rales.   Abdominal: Soft. Bowel sounds are normal. There is no tenderness.   Musculoskeletal: Normal range of motion.   Left lower extremity is slightly edematous compared to right lower extremity   Neurological: He is alert. No cranial nerve deficit. GCS eye subscore is 4. GCS verbal subscore is 5. GCS motor subscore is 6.   Skin: Skin is warm and dry. Capillary refill takes less than 2 seconds. No rash noted.   1 inch raised area to the central area of the left tib-fib, anterior plane with a quarter-sized area of eschar.  Small amount of clear drainage noted.  No fluctuance noted. Diffuse erythema noted to this area with streaking.  No lymphangitis.  Compartments are compressible.   Psychiatric: He has a normal mood and affect. His behavior is normal.         ED Course   Procedures  Labs Reviewed   CBC W/ AUTO DIFFERENTIAL - Abnormal; Notable for the following components:       Result Value    RBC 4.35 (*)     Hemoglobin 13.4 (*)     All other components within normal limits   C-REACTIVE PROTEIN - Abnormal; Notable for the following components:    CRP 86.2 (*)     All other components within normal limits   CULTURE, BLOOD   CULTURE, BLOOD   BASIC METABOLIC PANEL   C-REACTIVE PROTEIN   SEDIMENTATION RATE   SEDIMENTATION RATE          Imaging Results          CT Leg (Tibia-Fibula) Without Contrast Left (Final result)  Result time 08/21/18 02:49:26    Final result by Tere Edward MD (08/21/18 02:49:26)                 Impression:      Diffuse subcutaneous soft tissue edema with skin thickening and more rounded focal area of possible  induration seen at the proximal anterior aspect of the lower leg.  Findings could relate to cellulitis.  No definite focal fluid collection or abscess noting that evaluation is limited without the use of IV contrast.      Electronically signed by: Tere Edward MD  Date:    08/21/2018  Time:    02:49             Narrative:    EXAMINATION:  CT LEG (TIBIA-FIBULA) WITHOUT CONTRAST LEFT    CLINICAL HISTORY:  Lower leg erythema, swelling, cellulitis suspected;    TECHNIQUE:  CTA of the left lower lack tibia-fibular region was performed without the use of IV contrast.    COMPARISON:  None    FINDINGS:  Diffuse subcutaneous soft tissue edema is seen throughout the left lower leg.  There is soft tissue thickening with more focal rounded 2 cm area of possible induration seen at the proximal anterior aspect of the lower leg.  No definite focal fluid collections or abscess, noting evaluation is limited without the use of IV contrast.  Osseous structures show no evidence of fracture, dislocation, osseous destructive process.  Muscle bulk appears within normal limits.  No significant suprapatellar joint effusion.                                 Medical Decision Making:   Initial Assessment:   Urgent evaluation of a 33 y.o. male presenting to the emergency department complaining of pain complaint. Patient is afebrile, nontoxic appearing and hemodynamically stable.  Patient with failed outpatient therapy for left lower extremity cellulitis.  There is not a drainable abscess on exam.  There is an area of a quarter-sized eschar, possibly concerning for necrotizing fasciitis.  Patient's compartments are compressible.  No evidence of compartment syndrome.  Will obtain blood work, blood cultures, imaging, and initiate IV antibiotics  ED Management:  Possible that patient is causing this eschar with picking at the area- patient has picture from 1 day ago and this area does appear to rapidly progressed to this.  Lab work reveals no  leukocytosis.  BMP reveals no abnormalities.  CRP is elevated at 86.2.  CT leg reveals diffuse subcutaneous soft tissue edema with skin thickening and a rapid focal area of possible induration at the proximal anterior aspect of the lower leg, suggestive of cellulitis.  Muscle bulk appears within normal limits.  At this time, patient will be admitted to Hospital Medicine for observation and IV antibiotics for failed outpatient therapy of cellulitis.  General surgery will consulted in the morning.   Patient is stable for the floor.  Case was discussed with hospitalist who is amenable to this plan.  Other:   I have discussed this case with another health care provider.  This note was created using [employer] Dictation. There may be typographical errors secondary to dictation.                       Clinical Impression:     1. Cellulitis of left lower extremity                               David Rivera PA-C  08/21/18 0259

## 2018-08-21 NOTE — PLAN OF CARE
Met with patient at bedside regarding the need for a PCP - called the doctor's # on [employer] card but rings to a voicemail - spoke with [facility] & patient would have to schedule his own follow up appt - contact info provided - patient agreed to schedule appt

## 2018-08-21 NOTE — ED NOTES
Unable to establish IV access at this time. Pt uncooperative, and pulling arm away during insertion attempt.

## 2018-08-21 NOTE — CONSULTS
Consulted for wound to shin.  Patient with raised area on left shin, painful to touch, eschar present over roof of area, and blister forming on medial side of eschar.  Patient not receptive to scoring eschar for acceleration of enzymatic debridement or for debridement of wound.  Erythema decreased from previous marking on leg.  No drainage noted at this time.  Mild fluctuance of area noted.  Clover Wyatt PA-C present during examination; recommendation is for colagenase santyl daily with moist gauze dressing to cover.  Dry sterile dressing to cover moist dressing.  Wound photo taken by Clover Wyatt PA-C.

## 2018-08-21 NOTE — SUBJECTIVE & OBJECTIVE
Interval History: raised area left shin with scab, tender, surrounding area less erythematous    Review of Systems   Constitutional: Negative for activity change, appetite change, chills, fatigue and fever.   HENT: Negative for congestion, ear pain and postnasal drip.    Eyes: Negative for discharge.   Respiratory: Negative for apnea, shortness of breath and wheezing.    Cardiovascular: Negative for chest pain and leg swelling.   Gastrointestinal: Negative for abdominal distention, abdominal pain, nausea and vomiting.   Endocrine: Negative for polydipsia, polyphagia and polyuria.   Genitourinary: Negative for difficulty urinating, flank pain, frequency, hematuria and urgency.   Musculoskeletal: Positive for arthralgias and myalgias. Negative for joint swelling.   Skin: Positive for wound (left shin). Negative for pallor and rash.   Allergic/Immunologic: Negative for environmental allergies and food allergies.   Neurological: Negative for dizziness, speech difficulty, weakness, light-headedness and headaches.   Hematological: Does not bruise/bleed easily.   Psychiatric/Behavioral: Negative for agitation.     Objective:     Vital Signs (Most Recent):  Temp: 97.7 °F (36.5 °C) (08/21/18 0808)  Pulse: 71 (08/21/18 0808)  Resp: 18 (08/21/18 0808)  BP: 110/65 (08/21/18 0808)  SpO2: 97 % (08/21/18 0808) Vital Signs (24h Range):  Temp:  [97.7 °F (36.5 °C)-98.8 °F (37.1 °C)] 97.7 °F (36.5 °C)  Pulse:  [] 71  Resp:  [18-20] 18  SpO2:  [93 %-98 %] 97 %  BP: (110-133)/(57-77) 110/65     Weight: 78 kg (171 lb 15.3 oz)  Body mass index is 25.39 kg/m².    Intake/Output Summary (Last 24 hours) at 8/21/2018 1118  Last data filed at 8/21/2018 0600  Gross per 24 hour   Intake 756.67 ml   Output --   Net 756.67 ml      Physical Exam   Constitutional: He is oriented to person, place, and time. He appears well-developed and well-nourished. No distress.   HENT:   Head: Normocephalic and atraumatic.   Mouth/Throat: Oropharynx is clear  and moist.   Eyes: Conjunctivae are normal. Right eye exhibits no discharge. Left eye exhibits no discharge.   Neck: Normal range of motion. Neck supple.   Cardiovascular: Normal rate, regular rhythm, normal heart sounds and intact distal pulses.   Pulmonary/Chest: Effort normal and breath sounds normal. No stridor. No respiratory distress.   Abdominal: Soft. Bowel sounds are normal. He exhibits no distension. There is no tenderness.   Musculoskeletal:   Left shin large, eschar covered wound with erythema surrounding the lower leg; erythema improving   Neurological: He is alert and oriented to person, place, and time.   Skin: Skin is warm and dry. He is not diaphoretic.   Psychiatric: He has a normal mood and affect.   Nursing note and vitals reviewed.      Significant Labs:   CBC:   Recent Labs   Lab  08/21/18   0019  08/21/18   0507   WBC  9.82  8.12   HGB  13.4*  12.9*   HCT  40.6  38.3*   PLT  327  318     CMP:   Recent Labs   Lab  08/21/18 0019  08/21/18   0507   NA  139  139   K  4.1  4.3   CL  105  107   CO2  23  24   GLU  82  116*   BUN  9  7   CREATININE  1.0  0.8   CALCIUM  9.2  9.1   PROT   --   6.9   ALBUMIN   --   3.3*   BILITOT   --   0.3   ALKPHOS   --   58   AST   --   14   ALT   --   11   ANIONGAP  11  8   EGFRNONAA  >60  >60     All pertinent labs within the past 24 hours have been reviewed.    Significant Imaging: I have reviewed all pertinent imaging results/findings within the past 24 hours.   Imaging Results          CT Leg (Tibia-Fibula) Without Contrast Left (Final result)  Result time 08/21/18 02:49:26    Final result by Tere Edward MD (08/21/18 02:49:26)                 Impression:      Diffuse subcutaneous soft tissue edema with skin thickening and more rounded focal area of possible induration seen at the proximal anterior aspect of the lower leg.  Findings could relate to cellulitis.  No definite focal fluid collection or abscess noting that evaluation is limited without the use  of IV contrast.      Electronically signed by: Tere Edward MD  Date:    08/21/2018  Time:    02:49             Narrative:    EXAMINATION:  CT LEG (TIBIA-FIBULA) WITHOUT CONTRAST LEFT    CLINICAL HISTORY:  Lower leg erythema, swelling, cellulitis suspected;    TECHNIQUE:  CTA of the left lower lack tibia-fibular region was performed without the use of IV contrast.    COMPARISON:  None    FINDINGS:  Diffuse subcutaneous soft tissue edema is seen throughout the left lower leg.  There is soft tissue thickening with more focal rounded 2 cm area of possible induration seen at the proximal anterior aspect of the lower leg.  No definite focal fluid collections or abscess, noting evaluation is limited without the use of IV contrast.  Osseous structures show no evidence of fracture, dislocation, osseous destructive process.  Muscle bulk appears within normal limits.  No significant suprapatellar joint effusion.

## 2018-08-21 NOTE — ASSESSMENT & PLAN NOTE
CT pending. Large cellulitic wound. Bactrim for multiple days with worsening    Vancomycin Q12  Oxycodone for pain  Zofran for N/V  Wound Care consult

## 2018-08-21 NOTE — PROGRESS NOTES
[facility] Hospital Medicine  Progress Note    Patient Name: Tito Lua  MRN: [id number]  Patient Class: OP- Observation   Admission Date: 8/20/2018  Length of Stay: 0 days  Attending Physician: Jason Heard MD  Primary Care Provider: Primary Doctor No        Subjective:     Principal Problem:Cellulitis of left lower extremity    HPI:  Patient is a 33-year-old male with history of IV drug use who presents to the ED with a skin complaint.  Patient was seen here 4 days ago for a red, raised area to his left shin.  He was diagnosed with cellulitis and sent home with Bactrim and Bactroban.  Patient reports compliance with this.  He also reports an area of dark tissue that he noticed couple of days ago.  He states is not rapidly progress.  He also reports applying hydrogen peroxide and picking at this area.  He reports clear drainage. He reports chills but has not taken his temperature.  He denies numbness or tingling to this area.           Hospital Course:  Admitted with left shin cellulitis, non-improving on oral antibiotics outpatient. No leukocytosis, afebrile. Placed on Vancomycin. CT showed Diffuse subcutaneous soft tissue edema with skin thickening and more rounded focal area of possible induration seen at the proximal anterior aspect of the lower leg.  Findings could relate to cellulitis.  No definite focal fluid collection or abscess noting that evaluation is limited without the use of IV contrast.     Interval History: raised area left shin with scab, tender, surrounding area less erythematous    Review of Systems   Constitutional: Negative for activity change, appetite change, chills, fatigue and fever.   HENT: Negative for congestion, ear pain and postnasal drip.    Eyes: Negative for discharge.   Respiratory: Negative for apnea, shortness of breath and wheezing.    Cardiovascular: Negative for chest pain and leg swelling.   Gastrointestinal: Negative for abdominal distention,  abdominal pain, nausea and vomiting.   Endocrine: Negative for polydipsia, polyphagia and polyuria.   Genitourinary: Negative for difficulty urinating, flank pain, frequency, hematuria and urgency.   Musculoskeletal: Positive for arthralgias and myalgias. Negative for joint swelling.   Skin: Positive for wound (left shin). Negative for pallor and rash.   Allergic/Immunologic: Negative for environmental allergies and food allergies.   Neurological: Negative for dizziness, speech difficulty, weakness, light-headedness and headaches.   Hematological: Does not bruise/bleed easily.   Psychiatric/Behavioral: Negative for agitation.     Objective:     Vital Signs (Most Recent):  Temp: 97.7 °F (36.5 °C) (08/21/18 0808)  Pulse: 71 (08/21/18 0808)  Resp: 18 (08/21/18 0808)  BP: 110/65 (08/21/18 0808)  SpO2: 97 % (08/21/18 0808) Vital Signs (24h Range):  Temp:  [97.7 °F (36.5 °C)-98.8 °F (37.1 °C)] 97.7 °F (36.5 °C)  Pulse:  [] 71  Resp:  [18-20] 18  SpO2:  [93 %-98 %] 97 %  BP: (110-133)/(57-77) 110/65     Weight: 78 kg (171 lb 15.3 oz)  Body mass index is 25.39 kg/m².    Intake/Output Summary (Last 24 hours) at 8/21/2018 1118  Last data filed at 8/21/2018 0600  Gross per 24 hour   Intake 756.67 ml   Output --   Net 756.67 ml      Physical Exam   Constitutional: He is oriented to person, place, and time. He appears well-developed and well-nourished. No distress.   HENT:   Head: Normocephalic and atraumatic.   Mouth/Throat: Oropharynx is clear and moist.   Eyes: Conjunctivae are normal. Right eye exhibits no discharge. Left eye exhibits no discharge.   Neck: Normal range of motion. Neck supple.   Cardiovascular: Normal rate, regular rhythm, normal heart sounds and intact distal pulses.   Pulmonary/Chest: Effort normal and breath sounds normal. No stridor. No respiratory distress.   Abdominal: Soft. Bowel sounds are normal. He exhibits no distension. There is no tenderness.   Musculoskeletal:   Left shin large, eschar  covered wound with erythema surrounding the lower leg; erythema improving   Neurological: He is alert and oriented to person, place, and time.   Skin: Skin is warm and dry. He is not diaphoretic.   Psychiatric: He has a normal mood and affect.   Nursing note and vitals reviewed.      Significant Labs:   CBC:   Recent Labs   Lab  08/21/18   0019  08/21/18   0507   WBC  9.82  8.12   HGB  13.4*  12.9*   HCT  40.6  38.3*   PLT  327  318     CMP:   Recent Labs   Lab  08/21/18   0019  08/21/18   0507   NA  139  139   K  4.1  4.3   CL  105  107   CO2  23  24   GLU  82  116*   BUN  9  7   CREATININE  1.0  0.8   CALCIUM  9.2  9.1   PROT   --   6.9   ALBUMIN   --   3.3*   BILITOT   --   0.3   ALKPHOS   --   58   AST   --   14   ALT   --   11   ANIONGAP  11  8   EGFRNONAA  >60  >60     All pertinent labs within the past 24 hours have been reviewed.    Significant Imaging: I have reviewed all pertinent imaging results/findings within the past 24 hours.   Imaging Results          CT Leg (Tibia-Fibula) Without Contrast Left (Final result)  Result time 08/21/18 02:49:26    Final result by Tere Edward MD (08/21/18 02:49:26)                 Impression:      Diffuse subcutaneous soft tissue edema with skin thickening and more rounded focal area of possible induration seen at the proximal anterior aspect of the lower leg.  Findings could relate to cellulitis.  No definite focal fluid collection or abscess noting that evaluation is limited without the use of IV contrast.      Electronically signed by: Tere Edward MD  Date:    08/21/2018  Time:    02:49             Narrative:    EXAMINATION:  CT LEG (TIBIA-FIBULA) WITHOUT CONTRAST LEFT    CLINICAL HISTORY:  Lower leg erythema, swelling, cellulitis suspected;    TECHNIQUE:  CTA of the left lower lack tibia-fibular region was performed without the use of IV contrast.    COMPARISON:  None    FINDINGS:  Diffuse subcutaneous soft tissue edema is seen throughout the left lower leg.   There is soft tissue thickening with more focal rounded 2 cm area of possible induration seen at the proximal anterior aspect of the lower leg.  No definite focal fluid collections or abscess, noting evaluation is limited without the use of IV contrast.  Osseous structures show no evidence of fracture, dislocation, osseous destructive process.  Muscle bulk appears within normal limits.  No significant suprapatellar joint effusion.                                  Assessment/Plan:      * Cellulitis of left lower extremity    - cont Vanco q12  - wound care recs  - CT No definite focal fluid collection or abscess noting that evaluation is limited without the use of IV contrast.  - appears to be improving, anticipate transition to oral in 24 hours              Tobacco abuse    - declines nicotine patch  - smoking cessation            VTE Risk Mitigation (From admission, onward)        Ordered     enoxaparin injection 40 mg  Daily      08/21/18 0335     Place sequential compression device  Until discontinued      08/21/18 0335     IP VTE HIGH RISK PATIENT  Once      08/21/18 0335              Clover Wyatt PA-C  Department of Hospital Medicine   [facility]

## 2018-08-21 NOTE — HOSPITAL COURSE
Admitted with left shin cellulitis, non-improving on oral antibiotics outpatient. No leukocytosis, afebrile. Placed on Vancomycin. CT showed Diffuse subcutaneous soft tissue edema with skin thickening and more rounded focal area of possible induration seen at the proximal anterior aspect of the lower leg.  Findings could relate to cellulitis.  No definite focal fluid collection or abscess noting that evaluation is limited without the use of IV contrast. Clinically improved, vitals stable, discharged home to complete oral antibiotics. Patient instructed to return if symptoms worsen.

## 2018-08-21 NOTE — ASSESSMENT & PLAN NOTE
- cont Vanco q12  - wound care recs  - CT No definite focal fluid collection or abscess noting that evaluation is limited without the use of IV contrast.  - appears to be improving, anticipate transition to oral in 24 hours

## 2018-08-21 NOTE — H&P
[facility] Hospital Medicine  History & Physical    Patient Name: Tito Lua  MRN: [id number]  Admission Date: 8/20/2018  Attending Physician: Kelly Deal MD   Primary Care Provider: Primary Doctor No         Patient information was obtained from patient, past medical records and ER records.     Subjective:     Principal Problem:Cellulitis of left lower extremity    Chief Complaint:   Chief Complaint   Patient presents with    Abscess     to the front of the L lower leg x 1 wk        HPI: Patient is a 33-year-old male with history of IV drug use who presents to the ED with a skin complaint.  Patient was seen here 4 days ago for a red, raised area to his left shin.  He was diagnosed with cellulitis and sent home with Bactrim and Bactroban.  Patient reports compliance with this.  He also reports an area of dark tissue that he noticed couple of days ago.  He states is not rapidly progress.  He also reports applying hydrogen peroxide and picking at this area.  He reports clear drainage. He reports chills but has not taken his temperature.  He denies numbness or tingling to this area.           History reviewed. No pertinent past medical history.    Past Surgical History:   Procedure Laterality Date    HERNIA REPAIR         Review of patient's allergies indicates:  No Known Allergies    No current facility-administered medications on file prior to encounter.      Current Outpatient Medications on File Prior to Encounter   Medication Sig    mupirocin (BACTROBAN) 2 % ointment Apply topically 3 (three) times daily. for 10 days    sulfamethoxazole-trimethoprim 800-160mg (BACTRIM DS) 800-160 mg Tab Take 1 tablet by mouth 2 (two) times daily. for 7 days     Family History     None        Tobacco Use    Smoking status: Current Every Day Smoker    Smokeless tobacco: Never Used   Substance and Sexual Activity    Alcohol use: Yes    Drug use: Yes     Types: Marijuana    Sexual activity: Not on file      Review of Systems   Constitutional: Positive for activity change, appetite change, chills and fever. Negative for fatigue.   HENT: Negative for congestion, ear pain and postnasal drip.    Eyes: Negative for discharge.   Respiratory: Negative for apnea, shortness of breath and wheezing.    Cardiovascular: Negative for chest pain and leg swelling.   Gastrointestinal: Negative for abdominal distention, abdominal pain, nausea and vomiting.   Endocrine: Negative for polydipsia, polyphagia and polyuria.   Genitourinary: Negative for difficulty urinating, flank pain, frequency, hematuria and urgency.   Musculoskeletal: Positive for arthralgias and myalgias. Negative for joint swelling.   Skin: Positive for wound (left shin). Negative for pallor and rash.   Allergic/Immunologic: Negative for environmental allergies and food allergies.   Neurological: Negative for dizziness, speech difficulty, weakness, light-headedness and headaches.   Hematological: Does not bruise/bleed easily.   Psychiatric/Behavioral: Negative for agitation.     Objective:     Vital Signs (Most Recent):  Temp: 98.4 °F (36.9 °C) (08/21/18 0129)  Pulse: 76 (08/21/18 0232)  Resp: 20 (08/20/18 2026)  BP: 119/67 (08/21/18 0143)  SpO2: 96 % (08/21/18 0232) Vital Signs (24h Range):  Temp:  [98.2 °F (36.8 °C)-98.4 °F (36.9 °C)] 98.4 °F (36.9 °C)  Pulse:  [] 76  Resp:  [20] 20  SpO2:  [93 %-97 %] 96 %  BP: (116-131)/(57-67) 119/67     Weight: 78 kg (171 lb 15.3 oz)  Body mass index is 25.39 kg/m².    Physical Exam   Constitutional: He is oriented to person, place, and time. He appears well-developed and well-nourished.   HENT:   Head: Normocephalic.   Eyes: Conjunctivae are normal.   Neck: Normal range of motion. Neck supple.   Cardiovascular: Normal rate, regular rhythm, normal heart sounds and intact distal pulses.   Pulses:       Radial pulses are 2+ on the right side, and 2+ on the left side.        Dorsalis pedis pulses are 1+ on the right side,  and 1+ on the left side.   Pulmonary/Chest: Effort normal and breath sounds normal.   Abdominal: Soft. He exhibits no distension. Bowel sounds are increased. There is no tenderness.   Musculoskeletal: Normal range of motion.   Neurological: He is alert and oriented to person, place, and time. He has normal strength. GCS eye subscore is 4. GCS verbal subscore is 5. GCS motor subscore is 6.   Skin: Skin is warm and dry. Capillary refill takes less than 2 seconds.   Left shin large, eschar covered wound with cellulitis surrounding the lower leg   Psychiatric: He has a normal mood and affect. His speech is normal and behavior is normal.           Significant Labs:   CBC:   Recent Labs   Lab  08/21/18   0019   WBC  9.82   HGB  13.4*   HCT  40.6   PLT  327     CMP:   Recent Labs   Lab  08/21/18   0019   NA  139   K  4.1   CL  105   CO2  23   GLU  82   BUN  9   CREATININE  1.0   CALCIUM  9.2   ANIONGAP  11   EGFRNONAA  >60       Significant Imaging: I have reviewed all pertinent imaging results/findings within the past 24 hours.    Assessment/Plan:     * Cellulitis of left lower extremity    CT pending. Large cellulitic wound. Bactrim for multiple days with worsening    Vancomycin Q12  Oxycodone for pain  Zofran for N/V  Wound Care consult           VTE Risk Mitigation (From admission, onward)    None             Boris Apple NP  Department of Hospital Medicine   [facility]

## 2018-08-21 NOTE — SUBJECTIVE & OBJECTIVE
History reviewed. No pertinent past medical history.    Past Surgical History:   Procedure Laterality Date    HERNIA REPAIR         Review of patient's allergies indicates:  No Known Allergies    No current facility-administered medications on file prior to encounter.      Current Outpatient Medications on File Prior to Encounter   Medication Sig    mupirocin (BACTROBAN) 2 % ointment Apply topically 3 (three) times daily. for 10 days    sulfamethoxazole-trimethoprim 800-160mg (BACTRIM DS) 800-160 mg Tab Take 1 tablet by mouth 2 (two) times daily. for 7 days     Family History     None        Tobacco Use    Smoking status: Current Every Day Smoker    Smokeless tobacco: Never Used   Substance and Sexual Activity    Alcohol use: Yes    Drug use: Yes     Types: Marijuana    Sexual activity: Not on file     Review of Systems   Constitutional: Positive for activity change, appetite change, chills and fever. Negative for fatigue.   HENT: Negative for congestion, ear pain and postnasal drip.    Eyes: Negative for discharge.   Respiratory: Negative for apnea, shortness of breath and wheezing.    Cardiovascular: Negative for chest pain and leg swelling.   Gastrointestinal: Negative for abdominal distention, abdominal pain, nausea and vomiting.   Endocrine: Negative for polydipsia, polyphagia and polyuria.   Genitourinary: Negative for difficulty urinating, flank pain, frequency, hematuria and urgency.   Musculoskeletal: Positive for arthralgias and myalgias. Negative for joint swelling.   Skin: Positive for wound (left shin). Negative for pallor and rash.   Allergic/Immunologic: Negative for environmental allergies and food allergies.   Neurological: Negative for dizziness, speech difficulty, weakness, light-headedness and headaches.   Hematological: Does not bruise/bleed easily.   Psychiatric/Behavioral: Negative for agitation.     Objective:     Vital Signs (Most Recent):  Temp: 98.4 °F (36.9 °C) (08/21/18  0129)  Pulse: 76 (08/21/18 0232)  Resp: 20 (08/20/18 2026)  BP: 119/67 (08/21/18 0143)  SpO2: 96 % (08/21/18 0232) Vital Signs (24h Range):  Temp:  [98.2 °F (36.8 °C)-98.4 °F (36.9 °C)] 98.4 °F (36.9 °C)  Pulse:  [] 76  Resp:  [20] 20  SpO2:  [93 %-97 %] 96 %  BP: (116-131)/(57-67) 119/67     Weight: 78 kg (171 lb 15.3 oz)  Body mass index is 25.39 kg/m².    Physical Exam   Constitutional: He is oriented to person, place, and time. He appears well-developed and well-nourished.   HENT:   Head: Normocephalic.   Eyes: Conjunctivae are normal.   Neck: Normal range of motion. Neck supple.   Cardiovascular: Normal rate, regular rhythm, normal heart sounds and intact distal pulses.   Pulses:       Radial pulses are 2+ on the right side, and 2+ on the left side.        Dorsalis pedis pulses are 1+ on the right side, and 1+ on the left side.   Pulmonary/Chest: Effort normal and breath sounds normal.   Abdominal: Soft. He exhibits no distension. Bowel sounds are increased. There is no tenderness.   Musculoskeletal: Normal range of motion.   Neurological: He is alert and oriented to person, place, and time. He has normal strength. GCS eye subscore is 4. GCS verbal subscore is 5. GCS motor subscore is 6.   Skin: Skin is warm and dry. Capillary refill takes less than 2 seconds.   Left shin large, eschar covered wound with cellulitis surrounding the lower leg   Psychiatric: He has a normal mood and affect. His speech is normal and behavior is normal.           Significant Labs:   CBC:   Recent Labs   Lab  08/21/18   0019   WBC  9.82   HGB  13.4*   HCT  40.6   PLT  327     CMP:   Recent Labs   Lab  08/21/18   0019   NA  139   K  4.1   CL  105   CO2  23   GLU  82   BUN  9   CREATININE  1.0   CALCIUM  9.2   ANIONGAP  11   EGFRNONAA  >60       Significant Imaging: I have reviewed all pertinent imaging results/findings within the past 24 hours.

## 2018-08-22 VITALS
RESPIRATION RATE: 18 BRPM | BODY MASS INDEX: 25.47 KG/M2 | SYSTOLIC BLOOD PRESSURE: 119 MMHG | HEART RATE: 76 BPM | DIASTOLIC BLOOD PRESSURE: 85 MMHG | WEIGHT: 171.94 LBS | TEMPERATURE: 98 F | HEIGHT: 69 IN | OXYGEN SATURATION: 98 %

## 2018-08-22 PROCEDURE — 25000003 PHARM REV CODE 250: Performed by: NURSE PRACTITIONER

## 2018-08-22 PROCEDURE — 25000003 PHARM REV CODE 250: Performed by: HOSPITALIST

## 2018-08-22 PROCEDURE — G0378 HOSPITAL OBSERVATION PER HR: HCPCS

## 2018-08-22 PROCEDURE — 63600175 PHARM REV CODE 636 W HCPCS: Performed by: HOSPITALIST

## 2018-08-22 PROCEDURE — 99226 PR SUBSEQUENT OBSERVATION CARE,LEVEL III: CPT | Mod: ,,, | Performed by: PHYSICIAN ASSISTANT

## 2018-08-22 PROCEDURE — 25000003 PHARM REV CODE 250: Performed by: PHYSICIAN ASSISTANT

## 2018-08-22 RX ORDER — SULFAMETHOXAZOLE AND TRIMETHOPRIM 800; 160 MG/1; MG/1
1 TABLET ORAL 2 TIMES DAILY
Qty: 14 TABLET | Refills: 0 | Status: SHIPPED | OUTPATIENT
Start: 2018-08-22 | End: 2018-08-29

## 2018-08-22 RX ORDER — SULFAMETHOXAZOLE AND TRIMETHOPRIM 800; 160 MG/1; MG/1
1 TABLET ORAL 2 TIMES DAILY
Qty: 6 TABLET | Refills: 0 | Status: SHIPPED | OUTPATIENT
Start: 2018-08-22 | End: 2018-08-22 | Stop reason: SDUPTHER

## 2018-08-22 RX ORDER — MUPIROCIN 20 MG/G
OINTMENT TOPICAL 3 TIMES DAILY
Status: DISCONTINUED | OUTPATIENT
Start: 2018-08-22 | End: 2018-08-22 | Stop reason: HOSPADM

## 2018-08-22 RX ORDER — SULFAMETHOXAZOLE AND TRIMETHOPRIM 800; 160 MG/1; MG/1
1 TABLET ORAL 2 TIMES DAILY
Qty: 6 TABLET | Refills: 0 | Status: SHIPPED | OUTPATIENT
Start: 2018-08-26

## 2018-08-22 RX ORDER — IBUPROFEN 200 MG
1 TABLET ORAL DAILY
Refills: 0 | COMMUNITY
Start: 2018-08-22

## 2018-08-22 RX ADMIN — VANCOMYCIN HYDROCHLORIDE 1500 MG: 1 INJECTION, POWDER, LYOPHILIZED, FOR SOLUTION INTRAVENOUS at 12:08

## 2018-08-22 RX ADMIN — MUPIROCIN: 20 OINTMENT TOPICAL at 09:08

## 2018-08-22 RX ADMIN — OXYCODONE HYDROCHLORIDE 10 MG: 5 TABLET ORAL at 07:08

## 2018-08-22 RX ADMIN — OXYCODONE HYDROCHLORIDE 10 MG: 5 TABLET ORAL at 12:08

## 2018-08-22 RX ADMIN — ACETAMINOPHEN 650 MG: 325 TABLET, FILM COATED ORAL at 05:08

## 2018-08-22 NOTE — NURSING
Patient admitted for cellulitis of LLE, erythema present but improving.  Patient VSS and afebrile.  Patient refused I and D of abscess, eschar present and patient is on Vanco, trough due prior to next dose.  Patient purposeful rounding completed, bed in low position and call light in reach.  Patient complained of pain and medication with relief.  Patient ambulates with no difficulty. Will continue to monitor VS.  Patient complained of headache, tylenol given.

## 2018-08-22 NOTE — DISCHARGE SUMMARY
[facility] Hospital Medicine  Discharge Summary      Patient Name: Tito Lua  MRN: [id number]  Admission Date: 8/20/2018  Hospital Length of Stay: 0 days  Discharge Date and Time:  08/22/2018 10:22 AM  Attending Physician: No att. providers found   Discharging Provider: Clover Wyatt PA-C  Primary Care Provider: [facility]      HPI:   Patient is a 33-year-old male with history of IV drug use who presents to the ED with a skin complaint.  Patient was seen here 4 days ago for a red, raised area to his left shin.  He was diagnosed with cellulitis and sent home with Bactrim and Bactroban.  Patient reports compliance with this.  He also reports an area of dark tissue that he noticed couple of days ago.  He states is not rapidly progress.  He also reports applying hydrogen peroxide and picking at this area.  He reports clear drainage. He reports chills but has not taken his temperature.  He denies numbness or tingling to this area.           * No surgery found *      Hospital Course:   Admitted with left shin cellulitis, non-improving on oral antibiotics outpatient. No leukocytosis, afebrile. Placed on Vancomycin. CT showed Diffuse subcutaneous soft tissue edema with skin thickening and more rounded focal area of possible induration seen at the proximal anterior aspect of the lower leg.  Findings could relate to cellulitis.  No definite focal fluid collection or abscess noting that evaluation is limited without the use of IV contrast. Clinically improved, vitals stable, discharged home to complete oral antibiotics. Patient instructed to return if symptoms worsen.      Consults:     * Cellulitis of left lower extremity    - clinically improved, patient states pain has resolved, ambulating, erythema improved, swelling reduced  - CT No definite focal fluid collection or abscess noting that evaluation is limited without the use of IV contrast.  -  Eager to go home, instructed to  complete oral abx , follow up with PCP or return if worsens              Tobacco abuse    - smoking cessation            Final Active Diagnoses:    Diagnosis Date Noted POA    PRINCIPAL PROBLEM:  Cellulitis of left lower extremity [L03.116] 08/21/2018 Yes    Tobacco abuse [Z72.0] 08/21/2018 Yes      Problems Resolved During this Admission:       Discharged Condition: stable    Disposition: Home or Self Care    Follow Up:  Follow-up Information     [facility] In 1 week.    Why:  establish primary care  Contact information:  [address] [id number] [phone]                 Patient Instructions:      Diet Adult Regular     Notify your health care provider if you experience any of the following:  temperature >100.4     Notify your health care provider if you experience any of the following:  persistent nausea and vomiting or diarrhea     Notify your health care provider if you experience any of the following:  worsening rash     Activity as tolerated       Significant Diagnostic Studies: Labs:   CMP   Recent Labs   Lab  08/21/18   0019  08/21/18   0507   NA  139  139   K  4.1  4.3   CL  105  107   CO2  23  24   GLU  82  116*   BUN  9  7   CREATININE  1.0  0.8   CALCIUM  9.2  9.1   PROT   --   6.9   ALBUMIN   --   3.3*   BILITOT   --   0.3   ALKPHOS   --   58   AST   --   14   ALT   --   11   ANIONGAP  11  8   ESTGFRAFRICA  >60  >60   EGFRNONAA  >60  >60   , CBC   Recent Labs   Lab  08/21/18   0019  08/21/18   0507   WBC  9.82  8.12   HGB  13.4*  12.9*   HCT  40.6  38.3*   PLT  327  318    and All labs within the past 24 hours have been reviewed  Microbiology:    Microbiology Results (last 7 days)     Procedure Component Value Units Date/Time    Blood Culture #2 **CANNOT BE ORDERED STAT** [[id number]] Collected:  08/21/18 0035    Order Status:  Completed Specimen:  Blood from Peripheral, Upper Arm, Left Updated:  08/22/18 1013     Blood Culture, Routine No Growth to date      Blood Culture, Routine No Growth to date    Blood Culture #1 **CANNOT BE ORDERED STAT** [[id number]] Collected:  08/21/18 0019    Order Status:  Completed Specimen:  Blood from Peripheral, Hand, Left Updated:  08/22/18 1013     Blood Culture, Routine No Growth to date     Blood Culture, Routine No Growth to date    Aerobic culture [[id number]] Collected:  08/21/18 1604    Order Status:  Completed Specimen:  Wound from Leg, Left Updated:  08/22/18 0755     Aerobic Bacterial Culture No growth        Imaging Results          CT Leg (Tibia-Fibula) Without Contrast Left (Final result)  Result time 08/21/18 02:49:26    Final result by Tere Edward MD (08/21/18 02:49:26)                 Impression:      Diffuse subcutaneous soft tissue edema with skin thickening and more rounded focal area of possible induration seen at the proximal anterior aspect of the lower leg.  Findings could relate to cellulitis.  No definite focal fluid collection or abscess noting that evaluation is limited without the use of IV contrast.      Electronically signed by: Tere Edward MD  Date:    08/21/2018  Time:    02:49             Narrative:    EXAMINATION:  CT LEG (TIBIA-FIBULA) WITHOUT CONTRAST LEFT    CLINICAL HISTORY:  Lower leg erythema, swelling, cellulitis suspected;    TECHNIQUE:  CTA of the left lower lack tibia-fibular region was performed without the use of IV contrast.    COMPARISON:  None    FINDINGS:  Diffuse subcutaneous soft tissue edema is seen throughout the left lower leg.  There is soft tissue thickening with more focal rounded 2 cm area of possible induration seen at the proximal anterior aspect of the lower leg.  No definite focal fluid collections or abscess, noting evaluation is limited without the use of IV contrast.  Osseous structures show no evidence of fracture, dislocation, osseous destructive process.  Muscle bulk appears within normal limits.  No significant suprapatellar joint effusion.                                   Pending Diagnostic Studies:     None         Medications:   Tito Lua   Home Medication Instructions LEANNE:[id number]    Printed on:08/22/18 1023   Medication Information                      mupirocin (BACTROBAN) 2 % ointment  Apply topically 3 (three) times daily. for 10 days             nicotine (NICODERM CQ) 21 mg/24 hr  Place 1 patch onto the skin once daily.             sulfamethoxazole-trimethoprim 800-160mg (BACTRIM DS) 800-160 mg Tab  Take 1 tablet by mouth 2 (two) times daily.                 Indwelling Lines/Drains at time of discharge:   Lines/Drains/Airways          None          Time spent on the discharge of patient: >30 minutes  Patient was seen and examined on the date of discharge and determined to be suitable for discharge.         Clover Wyatt PA-C  Department of Hospital Medicine  [facility]

## 2018-08-22 NOTE — NURSING
In agreement and eager for DC.  VU of DC instructions, paperwork and prescription passed. IV.  removed with cath tip intact, WNL.  To be DC home with fami Requested  transport team.  Free from falls or skin breakdown this hospital admission.   Provided dressing change for home use.

## 2018-08-22 NOTE — ASSESSMENT & PLAN NOTE
- clinically improved, patient states pain has resolved, ambulating, erythema improved, swelling reduced  - CT No definite focal fluid collection or abscess noting that evaluation is limited without the use of IV contrast.  -  Eager to go home, instructed to complete oral abx , follow up with PCP or return if worsens

## 2018-08-22 NOTE — TREATMENT PLAN
8/22/2018    Patient: Tito Lua    YOB: 1985    To whom it may concern,    Tito Lua was admitted to the hospital under my care on 8/20/2018 and was discharged on 8/22/2018.  Cleared to return to work on Thursday 8/23/2018.    If you have any questions or concerns, please don't hesitate to contact the department of hospital medicine at [phone]    Sincerely,          Clover Wyatt PA-C  Department of Hospital Medicine

## 2018-08-22 NOTE — PLAN OF CARE
08/22/18 0915   Final Note   Assessment Type Final Discharge Note   Discharge Disposition Home   What phone number can be called within the next 1-3 days to see how you are doing after discharge? [phone]   Discharge plans and expectations educations in teach back method with documentation complete? Yes   Right Care Referral Info   Post Acute Recommendation No Care

## 2018-08-22 NOTE — DISCHARGE INSTRUCTIONS
Resume your home antibiotics, then complete course with new script      Cellulitis  Cellulitis is an infection of the deep layers of skin. A break in the skin, such as a cut or scratch, can let bacteria under the skin. If the bacteria get to deep layers of the skin, it can be serious. If not treated, cellulitis can get into the bloodstream and lymph nodes. The infection can then spread throughout the body. This causes serious illness.  Cellulitis causes the affected skin to become red, swollen, warm, and sore. The reddened areas have a visible border. An open sore may leak fluid (pus). You may have a fever, chills, and pain.  Cellulitis is treated with antibiotics taken for 7 to 10 days. An open sore may be cleaned and covered with cool wet gauze. Symptoms should get better 1 to 2 days after treatment is started. Make sure to take all the antibiotics for the full number of days until they are gone. Keep taking the medicine even if your symptoms go away.  Home care  Follow these tips:  · Limit the use of the part of your body with cellulitis.   · If the infection is on your leg, keep your leg raised while sitting. This will help to reduce swelling.  · Take all of the antibiotic medicine exactly as directed until it is gone. Do not miss any doses, especially during the first 7 days. Dont stop taking the medicine when your symptoms get better.  · Keep the affected area clean and dry.  · Wash your hands with soap and warm water before and after touching your skin. Anyone else who touches your skin should also wash his or her hands. Don't share towels.  Follow-up care  Follow up with your healthcare provider, or as advised. If your infection does not go away on the first antibiotic, your healthcare provider will prescribe a different one.  When to seek medical advice  Call your healthcare provider right away if any of these occur:  · Red areas that spread  · Swelling or pain that gets worse  · Fluid leaking from the  skin (pus)  · Fever higher of 100.4º F (38.0º C) or higher after 2 days on antibiotics  Date Last Reviewed: 9/1/2016 © 2000-2017 [email or web address] [address] [place] [address], [place] [address]. All rights reserved. This information is not intended as a substitute for professional medical care. Always follow your healthcare professional's instructions.      Wound Care  Taking proper care of your wound will help it heal. Your healthcare provider may show you how to clean and dress the wound. He or she will also explain how to tell if the wound is healing normally. If you are unsure of how to take care of the wound, be sure to clarify what dressing to use and how often you should change the bandages. Here are the basic steps.     A wound that's not healing normally may be dark in color or have white streaks.   Wash your hands  Tips for washing your hands include:  · Use liquid soap and lather for 2 minutes. Scrub between your fingers and under your nails.  · Rinse with warm water, keeping your fingers pointing down.  · Use a paper towel to dry your hands and to turn off the faucet.  Remove the used dressing  Here are suggestions for removing the dressing:  · If dressing changes cause you pain, be sure to take your pain medicine as prescribed by your healthcare provider 30 minutes before dressing changes.  · Set up your supplies.  · Put on disposable gloves if youre dressing a wound for someone else or your wound is infected.  · Loosen the tape by pulling gently toward the wound.  · Gently take off the old dressing. If the dressing is stuck to the wound, moisten it with saline (if available) or clean water.  · If you have a drain or tube in the wound, be careful not to pull on it.  · Remove the dressing 1 layer at a time and put it in a plastic bag.  · Remove your gloves.  Inspect and dress the wound  Check the wound carefully:  · Each time you change the dressing, inspect the wound carefully to be sure its  healing normally by making sure your wound appears to be pink and moist, and is free of infection.    · Wash your hands again. Put on a new pair of gloves.  · Clean and dress the wound as directed by your healthcare provider or nurse. Do not put anything in the wound that is not prescribed or directed by your healthcare provider. If you have a drain or tube, be careful not to pull on it. Make sure to secure the drain or tube as well.  · Put all supplies in a plastic bag. Seal the bag and put it in the trash.  · Be sure to wash your hands again.  Call your healthcare provider  Call your healthcare provider if you see any of the following signs of a problem:  · Bleeding that soaks the dressing  · Pink fluid weeping from the wound  · Increased drainage or drainage that is yellow, yellow-green, or foul-smelling  · Increased swelling or pain, or redness or swelling in the skin around the wound  · A change in the color of the wound, or if streaks develop in a direction away from the wound  · The area between any stitches opens up  · An increase in the size of the wound  · A fever of 100.4°F (38°C) or higher, or as directed by your healthcare provider  · Chills, increased fatigue, or a loss of appetite      Date Last Reviewed: 7/30/2015 © 2000-2017 [employer]. [address] [place] [address], [place] [address]. All rights reserved. This information is not intended as a substitute for professional medical care. Always follow your healthcare professional's instructions.      Sulfamethoxazole; Trimethoprim, SMX-TMP tablets  What is this medicine?  SULFAMETHOXAZOLE; TRIMETHOPRIM or SMX-TMP (suhl fuh meth OK reilly zohl; trye METH oh prim) is a combination of a sulfonamide antibiotic and a second antibiotic, trimethoprim. It is used to treat or prevent certain kinds of bacterial infections. It will not work for colds, flu, or other viral infections.  How should I use this medicine?  Take this medicine by mouth with a full  glass of water. Follow the directions on the prescription label. Take your medicine at regular intervals. Do not take it more often than directed. Do not skip doses or stop your medicine early.  Talk to your pediatrician regarding the use of this medicine in children. Special care may be needed. This medicine has been used in children as young as 2 months of age.  What side effects may I notice from receiving this medicine?  Side effects that you should report to your doctor or health care professional as soon as possible:  · allergic reactions like skin rash or hives, swelling of the face, lips, or tongue  · breathing problems  · fever or chills, sore throat  · irregular heartbeat, chest pain  · joint or muscle pain  · pain or difficulty passing urine  · red pinpoint spots on skin  · redness, blistering, peeling or loosening of the skin, including inside the mouth  · unusual bleeding or bruising  · unusually weak or tired  · yellowing of the eyes or skin  Side effects that usually do not require medical attention (report to your doctor or health care professional if they continue or are bothersome):  · diarrhea  · dizziness  · headache  · loss of appetite  · nausea, vomiting  · nervousness  What may interact with this medicine?  Do not take this medicine with any of the following medications:  · aminobenzoate potassium  · dofetilide  · metronidazole  This medicine may also interact with the following medications:  · ACE inhibitors like benazepril, enalapril, lisinopril, and ramipril  · birth control pills  · cyclosporine  · digoxin  · diuretics  · indomethacin  · medicines for diabetes  · methenamine  · methotrexate  · phenytoin  · potassium supplements  · pyrimethamine  · sulfinpyrazone  · tricyclic antidepressants  · warfarin  What if I miss a dose?  If you miss a dose, take it as soon as you can. If it is almost time for your next dose, take only that dose. Do not take double or extra doses.  Where should I keep  my medicine?  Keep out of the reach of children.  Store at room temperature between 20 to 25 degrees C (68 to 77 degrees F). Protect from light. Throw away any unused medicine after the expiration date.  What should I tell my health care provider before I take this medicine?  They need to know if you have any of these conditions:  · anemia  · asthma  · being treated with anticonvulsants  · if you frequently drink alcohol containing drinks  · kidney disease  · liver disease  · low level of folic acid or glucose-6-phosphate dehydrogenase  · poor nutrition or malabsorption  · porphyria  · severe allergies  · thyroid disorder  · an unusual or allergic reaction to sulfamethoxazole, trimethoprim, sulfa drugs, other medicines, foods, dyes, or preservatives  · pregnant or trying to get pregnant  · breast-feeding  What should I watch for while using this medicine?  Tell your doctor or health care professional if your symptoms do not improve. Drink several glasses of water a day to reduce the risk of kidney problems.  Do not treat diarrhea with over the counter products. Contact your doctor if you have diarrhea that lasts more than 2 days or if it is severe and watery.  This medicine can make you more sensitive to the sun. Keep out of the sun. If you cannot avoid being in the sun, wear protective clothing and use a sunscreen. Do not use sun lamps or tanning beds/booths.  NOTE:This sheet is a summary. It may not cover all possible information. If you have questions about this medicine, talk to your doctor, pharmacist, or health care provider. Copyright© 2017 [employer]              Handwashing: Tips for Patients, Family, and Friends    Germs are everywhere around us. Normally, we live with germs without getting sick. In certain cases, harmful germs cause us to get sick with an infection. Or we can spread harmful germs to others and cause them to get sick. Keeping your hands clean is the best way to prevent getting or  spreading germs that cause infection. Wash your hands with soap and water or use an alcohol-based hand .  When to clean your hands: For patients  In the hospital or in your home, you can come in contact with many harmful germs. To help prevent infection, wash your hands often, especially:  · After using the bathroom  · Before and after eating  · After coughing or sneezing  · After using a tissue  · After touching or changing a dressing or bandage  · After touching any object or surface that may be contaminated  · After touching an animal during a pet therapy session (hospital)  · After touching an animal, cleaning up after a pet, or preparing food for pets (home)  If you dont have access to soap and water, use an alcohol-based hand gel containing at least 60% alcohol. These products kill most germs and are easy to use. But use soap and water (not alcohol-based hand gel) if your hands are visibly dirty.  When to clean your hands: For family and friends  When visiting or caring for a loved one, washing your hands or using an alcohol-based hand  can help stop germs from spreading. Wash your hands:  · Before entering and after leaving the patients room  · As soon as you remove gloves or other protective clothing  · After changing a dressing or bandage  · After any contact with blood or other body fluids  · After touching or changing the patients bed linen or towels  · After touching an animal during a pet therapy session (hospital)  · After touching an animal, cleaning up after a pet, or preparing food for pets (home)  Many hospitals have sinks or gel dispensers right outside patient rooms. If not, carry a bottle of alcohol-based hand gel with you, and use it every time you visit. Use soap and water (not alcohol-based hand gel) if your hands are visibly dirty.  Tips for good handwashing  Here are some suggestions to follow:  · Use warm water and plenty of soap. Work up a good lather.  · Clean the whole  hand, including under your nails, between your fingers, and up the wrists.  · Wash for at least 15 to 20 seconds. Dont just wipe. Scrub well.  · Rinse, letting the water run down your fingers, not up your wrists.  · Dry your hands well. Use a paper towel to turn off the faucet and open the door.  Time matters  The longer you wash your hands, the more germs youll remove. Most people wash their hands for 6 to 7 seconds. But at least 15 seconds are needed to remove germs. Singing Happy Birthday or the [place] Song are examples of how long 15 seconds would be. To protect yourself and others from infection, washing for 30 seconds is best.  How to use an alcohol-based hand   Alcohol-based hand  may kill more germs than soap and water. Use them when your hands arent visibly dirty. For best results, follow these steps:  · Choose a gel or spray that contains at least 60 percent alcohol. Products with less alcohol may not kill germs.  · Spread about a tablespoon of  in the palm of one hand.  · Rub your hands together briskly, cleaning the backs of your hands, the palms, between your fingers, and up the wrists.  · Rub until the  is gone, and your hands are completely dry.  How do antibacterial soaps and alcohol-based hand  differ?  Antibacterial soaps:  · Come in liquid or bar form and are used with water  · Are no better at removing germs than plain soap  Alcohol-based hand :  · Come in gels or sprays that dont need water  · Are as or more effective than washing with soap and water   Date Last Reviewed: 12/1/2016  © 0380-4960 [employer]. [address] [place] [address], [place] [address]. All rights reserved. This information is not intended as a substitute for professional medical care. Always follow your healthcare professional's instructions.

## 2018-08-24 LAB — BACTERIA SPEC AEROBE CULT: NORMAL

## 2018-08-26 LAB
BACTERIA BLD CULT: NORMAL
BACTERIA BLD CULT: NORMAL